# Patient Record
Sex: FEMALE | Race: WHITE | NOT HISPANIC OR LATINO | URBAN - METROPOLITAN AREA
[De-identification: names, ages, dates, MRNs, and addresses within clinical notes are randomized per-mention and may not be internally consistent; named-entity substitution may affect disease eponyms.]

---

## 2020-06-18 ENCOUNTER — OFFICE VISIT (OUTPATIENT)
Dept: URBAN - METROPOLITAN AREA CLINIC 113 | Facility: CLINIC | Age: 75
End: 2020-06-18

## 2020-07-25 ENCOUNTER — TELEPHONE ENCOUNTER (OUTPATIENT)
Dept: URBAN - METROPOLITAN AREA CLINIC 13 | Facility: CLINIC | Age: 75
End: 2020-07-25

## 2020-07-25 RX ORDER — POLYETHYLENE GLYCOL 3350, SODIUM CHLORIDE, SODIUM BICARBONATE AND POTASSIUM CHLORIDE WITH LEMON FLAVOR 420; 11.2; 5.72; 1.48 G/4L; G/4L; G/4L; G/4L
DRINK 32 OUNCES AT 5PM DAY BEFORE PROCEDURE AND 6 HOURS PRIOR POWDER, FOR SOLUTION ORAL
Qty: 1 | Refills: 0 | OUTPATIENT
Start: 2014-01-06 | End: 2014-02-04

## 2020-07-25 RX ORDER — DULOXETINE HCL 30 MG
TAKE 1 CAPSULE DAILY CAPSULE,DELAYED RELEASE (ENTERIC COATED) ORAL
Refills: 0 | OUTPATIENT
End: 2015-04-14

## 2020-07-25 RX ORDER — FUROSEMIDE 20 MG/1
TAKE 1 TABLET  PRN TABLET ORAL
Refills: 0 | OUTPATIENT
End: 2015-01-30

## 2020-07-25 RX ORDER — CITALOPRAM HYDROBROMIDE 10 MG/1
TAKE 1 TABLET DAILY TABLET, FILM COATED ORAL
Qty: 30 | Refills: 6 | OUTPATIENT
Start: 2015-05-05 | End: 2015-07-24

## 2020-07-25 RX ORDER — POTASSIUM CHLORIDE 750 MG/1
TAKE 4 CAPSULE DAILY CAPSULE, EXTENDED RELEASE ORAL
Qty: 16 | Refills: 0 | OUTPATIENT
Start: 2015-04-29 | End: 2016-12-23

## 2020-07-25 RX ORDER — OXYCODONE HYDROCHLORIDE 10 MG/1
TAKE 1 TABLET EVERY 6 HOURS PRN ABDOMINAL PAIN TABLET ORAL
Qty: 75 | Refills: 0 | OUTPATIENT
Start: 2015-10-23 | End: 2015-11-19

## 2020-07-25 RX ORDER — POLYETHYLENE GLYCOL 3350, SODIUM CHLORIDE, SODIUM BICARBONATE AND POTASSIUM CHLORIDE WITH LEMON FLAVOR 420; 11.2; 5.72; 1.48 G/4L; G/4L; G/4L; G/4L
TAKE AS DIRECTED POWDER, FOR SOLUTION ORAL
Qty: 1 | Refills: 0 | OUTPATIENT
Start: 2016-12-23 | End: 2016-12-23

## 2020-07-25 RX ORDER — PANTOPRAZOLE SODIUM 40 MG/1
TAKE 1 TABLET DAILY TABLET, DELAYED RELEASE ORAL
Qty: 90 | Refills: 3 | OUTPATIENT
Start: 2015-06-11 | End: 2016-04-21

## 2020-07-25 RX ORDER — DIPHENOXYLATE HYDROCHLORIDE AND ATROPINE SULFATE 2.5; .025 MG/1; MG/1
TAKE 1-2 EVERY 6 HOURS PRN DIARRHEA TABLET ORAL
Qty: 60 | Refills: 4 | OUTPATIENT
Start: 2017-03-27 | End: 2018-08-17

## 2020-07-25 RX ORDER — MEGESTROL ACETATE 40 MG/ML
TAKE TEN MILLILITERS BY MOUTH DAILY SUSPENSION ORAL
Qty: 2 | Refills: 11 | OUTPATIENT
Start: 2018-12-10 | End: 2019-02-08

## 2020-07-25 RX ORDER — CYANOCOBALAMIN 1000 UG/ML
GIVE 1 ML(1000MG) IM WEEKLY X 4 WEEKS AND THEN ONCE MONTHLY INJECTION INTRAMUSCULAR; SUBCUTANEOUS
Qty: 1 | Refills: 6 | OUTPATIENT
Start: 2018-09-17 | End: 2019-02-08

## 2020-07-25 RX ORDER — CLOPIDOGREL BISULFATE 75 MG
TAKE 1 TABLET DAILY TABLET ORAL
Refills: 0 | OUTPATIENT
End: 2018-12-03

## 2020-07-25 RX ORDER — SIMVASTATIN 80 MG/1
TAKE 1 TABLET DAILY TABLET, FILM COATED ORAL
Refills: 0 | OUTPATIENT
Start: 2011-05-10 | End: 2015-01-30

## 2020-07-25 RX ORDER — CITALOPRAM 20 MG/1
TAKE 1 TABLET DAILY TABLET ORAL
Qty: 90 | Refills: 3 | OUTPATIENT
Start: 2015-06-11 | End: 2015-10-23

## 2020-07-25 RX ORDER — ARIPIPRAZOLE 2 MG/1
TAKE 1 TABLET DAILY AS DIRECTED TABLET ORAL
Refills: 0 | OUTPATIENT
End: 2018-08-17

## 2020-07-25 RX ORDER — OMEPRAZOLE 20 MG/1
TAKE 1 CAPSULE DAILY EVERY MORNING BEFORE BREAKFAST CAPSULE, DELAYED RELEASE ORAL
Refills: 0 | OUTPATIENT
End: 2015-04-14

## 2020-07-25 RX ORDER — TRAMADOL HYDROCHLORIDE 50 MG/1
TAKE 1 TABLET BY MOUTH EVERY 6 HOURS AS NEEDED FOR PAIN TABLET ORAL
Qty: 50 | Refills: 1 | OUTPATIENT
Start: 2015-05-05 | End: 2015-06-29

## 2020-07-25 RX ORDER — FLUTICASONE PROPIONATE AND SALMETEROL XINAFOATE 230; 21 UG/1; UG/1
INHALE 2 PUFFS TWICE DAILY UNKNOWN STRENGTH AEROSOL, METERED RESPIRATORY (INHALATION)
Refills: 0 | OUTPATIENT
Start: 2010-05-20 | End: 2015-04-14

## 2020-07-25 RX ORDER — RIFAXIMIN 550 MG/1
TAKE 1 TABLET 3 TIMES DAILY TABLET ORAL
Qty: 30 | Refills: 0 | OUTPATIENT
Start: 2014-12-23 | End: 2015-01-30

## 2020-07-25 RX ORDER — OXYCODONE HYDROCHLORIDE 10 MG/1
TAKE 1 TABLET EVERY 6 HOURS PRN ABDOMINAL PAIN TABLET ORAL
Qty: 75 | Refills: 0 | OUTPATIENT
Start: 2015-05-12 | End: 2016-12-23

## 2020-07-25 RX ORDER — METRONIDAZOLE 500 MG/1
TAKE 1 TABLET TWICE DAILY UNTIL FINISHED TABLET ORAL
Qty: 20 | Refills: 0 | OUTPATIENT
Start: 2013-10-01 | End: 2014-12-23

## 2020-07-25 RX ORDER — POLYETHYLENE GLYCOL 3350, SODIUM CHLORIDE, SODIUM BICARBONATE AND POTASSIUM CHLORIDE WITH LEMON FLAVOR 420; 11.2; 5.72; 1.48 G/4L; G/4L; G/4L; G/4L
TAKE HALF 5PM THE EVENING BEFORE THE PROCEDURE, AND TAKE THE OTHER HALF 6 HOURS BEFORE THE PROCEDURE POWDER, FOR SOLUTION ORAL
Qty: 1 | Refills: 0 | OUTPATIENT
Start: 2015-01-30 | End: 2015-04-14

## 2020-07-25 RX ORDER — CHLORHEXIDINE GLUCONATE 4 %
TAKE 1 TABLET DAILY LIQUID (ML) TOPICAL
Qty: 31 | Refills: 11 | OUTPATIENT
Start: 2012-06-28 | End: 2014-02-04

## 2020-07-25 RX ORDER — OXYCODONE AND ACETAMINOPHEN 7.5; 325 MG/1; MG/1
TAKE 1 TABLET EVERY 6 HOURS AS NEEDED FOR PAIN TABLET ORAL
Qty: 60 | Refills: 0 | OUTPATIENT
Start: 2018-12-03 | End: 2019-02-08

## 2020-07-25 RX ORDER — CHOLECALCIFEROL (VITAMIN D3) 1250 MCG
TAKE 1 CAPSULE DAILY CAPSULE ORAL
Refills: 0 | OUTPATIENT
End: 2015-04-14

## 2020-07-25 RX ORDER — FLUTICASONE PROPIONATE AND SALMETEROL 50; 250 UG/1; UG/1
INHALE 1 PUFF EVERY 12 HOURS POWDER RESPIRATORY (INHALATION)
Refills: 0 | OUTPATIENT
End: 2019-02-08

## 2020-07-25 RX ORDER — FLUTICASONE PROPIONATE AND SALMETEROL XINAFOATE 230; 21 UG/1; UG/1
INHALE 2 PUFFS AT 12 HOUR INTERVALS (MORNING AND EVENING) AEROSOL, METERED RESPIRATORY (INHALATION)
Refills: 0 | OUTPATIENT
Start: 2010-04-15 | End: 2010-05-20

## 2020-07-25 RX ORDER — ATORVASTATIN CALCIUM 40 MG/1
TAKE 1 TABLET DAILY TABLET, FILM COATED ORAL
Refills: 0 | OUTPATIENT
End: 2015-05-05

## 2020-07-25 RX ORDER — POTASSIUM CHLORIDE 1500 MG/1
TAKE 2 TABLET DAILY TABLET, FILM COATED, EXTENDED RELEASE ORAL
Qty: 8 | Refills: 0 | OUTPATIENT
Start: 2015-09-11 | End: 2016-12-23

## 2020-07-25 RX ORDER — BISMUTH SUBSALICYLATE 525 MG/1
TAKE 1 CAPSULE DAILY PT STATES UNKNOWN DOSE TABLET ORAL
Refills: 0 | OUTPATIENT
End: 2017-02-16

## 2020-07-25 RX ORDER — OMEPRAZOLE 20 MG/1
TAKE 1 TABLET DAILY CAPSULE, DELAYED RELEASE ORAL
Qty: 90 | Refills: 4 | OUTPATIENT
Start: 2012-05-15 | End: 2015-04-14

## 2020-07-25 RX ORDER — LUBIPROSTONE 8 UG/1
TAKE 1 CAPSULE DAILY UNKNOWN STRENGTH CAPSULE, GELATIN COATED ORAL
Refills: 0 | OUTPATIENT
Start: 2010-05-20 | End: 2010-06-14

## 2020-07-25 RX ORDER — EZETIMIBE AND SIMVASTATIN 10; 80 MG/1; MG/1
TAKE 1 TABLET DAILY TABLET ORAL
Refills: 0 | OUTPATIENT
Start: 2006-04-24 | End: 2010-05-20

## 2020-07-25 RX ORDER — BISMUTH SUBSALICYLATE 525 MG/1
TAKE 1 CAPSULE DAILY TABLET ORAL
Qty: 30 | Refills: 0 | OUTPATIENT
Start: 2013-10-01 | End: 2014-02-04

## 2020-07-25 RX ORDER — SUCRALFATE 1 G/10ML
TAKE 10 ML AFTER MEALS PRN HEARTBURN, UPSET STOMACH SUSPENSION ORAL
Qty: 450 | Refills: 11 | OUTPATIENT
Start: 2018-12-14 | End: 2019-02-08

## 2020-07-25 RX ORDER — ATORVASTATIN CALCIUM 10 MG/1
TAKE 1 TABLET DAILY TABLET, FILM COATED ORAL
Refills: 0 | OUTPATIENT
End: 2015-04-14

## 2020-07-26 ENCOUNTER — TELEPHONE ENCOUNTER (OUTPATIENT)
Dept: URBAN - METROPOLITAN AREA CLINIC 13 | Facility: CLINIC | Age: 75
End: 2020-07-26

## 2020-07-26 RX ORDER — HYDROCODONE BITARTRATE AND ACETAMINOPHEN 7.5; 325 MG/1; MG/1
TAKE 1 TABLET EVERY 8 HOURS PRN ABDOMINAL PAIN TABLET ORAL
Qty: 45 | Refills: 0 | Status: ACTIVE | COMMUNITY
Start: 2019-08-27

## 2020-07-26 RX ORDER — ONDANSETRON 8 MG/1
TAKE 1 TABLET EVERY 8 HOURS PRN NAUSEA TABLET ORAL
Qty: 45 | Refills: 11 | Status: ACTIVE | COMMUNITY
Start: 2018-12-03

## 2020-07-26 RX ORDER — FOLIC ACID 1 MG/1
TAKE 1 TABLET DAILY TABLET ORAL
Refills: 0 | Status: ACTIVE | COMMUNITY

## 2020-07-26 RX ORDER — CITALOPRAM 40 MG/1
TAKE 1 TABLET DAILY TABLET, FILM COATED ORAL
Qty: 90 | Refills: 3 | Status: ACTIVE | COMMUNITY
Start: 2020-04-28

## 2020-07-26 RX ORDER — MIDODRINE HYDROCHLORIDE 10 MG/1
TAKE 1 TABLET 3 TIMES DAILY TABLET ORAL
Refills: 0 | Status: ACTIVE | COMMUNITY

## 2020-07-26 RX ORDER — MIDODRINE HYDROCHLORIDE 10 MG/1
TAKE 1 TABLET TWICE DAILY TABLET ORAL
Qty: 2 | Refills: 0 | Status: ACTIVE | COMMUNITY
Start: 2018-02-26

## 2020-07-26 RX ORDER — ATORVASTATIN CALCIUM 40 MG/1
TAKE 1 TABLET DAILY AS DIRECTED TABLET, FILM COATED ORAL
Qty: 1 | Refills: 3 | Status: ACTIVE | COMMUNITY
Start: 2020-04-28

## 2020-07-26 RX ORDER — LEVOTHYROXINE SODIUM 0.05 MG/1
TAKE 1 TABLET DAILY TABLET ORAL
Refills: 0 | Status: ACTIVE | COMMUNITY

## 2020-07-26 RX ORDER — ASPIRIN 81 MG/1
TAKE 1 TABLET DAILY AS DIRECTED TABLET, DELAYED RELEASE ORAL
Refills: 0 | Status: ACTIVE | COMMUNITY

## 2020-07-26 RX ORDER — CYANOCOBALAMIN 1000 UG/ML
INJECT 1ML INTRAMUSCULARLY MONTHLY INJECTION INTRAMUSCULAR; SUBCUTANEOUS
Qty: 1 | Refills: 3 | Status: ACTIVE | COMMUNITY
Start: 2020-04-28

## 2020-07-26 RX ORDER — TIOTROPIUM BROMIDE 18 UG/1
INHALE CONTENTS OF 1 CAPSULE ONCE DAILY CAPSULE ORAL; RESPIRATORY (INHALATION)
Refills: 0 | Status: ACTIVE | COMMUNITY

## 2020-07-26 RX ORDER — FUROSEMIDE 20 MG/1
TAKE 1 TABLET DAILY TABLET ORAL
Qty: 30 | Refills: 11 | Status: ACTIVE | COMMUNITY
Start: 2020-04-28

## 2020-07-26 RX ORDER — PANTOPRAZOLE SODIUM 40 MG/1
TAKE 1 TABLET DAILY TABLET, DELAYED RELEASE ORAL
Qty: 90 | Refills: 3 | Status: ACTIVE | COMMUNITY
Start: 2015-04-14

## 2020-08-07 ENCOUNTER — OFFICE VISIT (OUTPATIENT)
Dept: URBAN - METROPOLITAN AREA CLINIC 113 | Facility: CLINIC | Age: 75
End: 2020-08-07

## 2020-08-07 NOTE — HPI-TODAY'S VISIT:
This is a 75-year-old female with a history of chronic pancreatitis with pancreatic cyst, GERD, gastric bypass in 2015, IBS, liver enzyme elevation, iron deficiency anemia, G-tube placement and bypassed stomach section for nutrition, and adenomatous colon polyps due surveillance in 2022, she was seen in hospital consultation 6/16/2020 for diarrhea.  Her feeding tube had fallen out 3 to 4 months prior and, because she was eating fairly well at the time, they had not return to the emergency department to have it replaced.  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  She had experienced a significant exacerbation of diarrhea for 3 weeks and CT scan revealed some mucosal thickening throughout the colon.  Stool studies were ordered.  She was to continue with Cipro and Flagyl.  Zenpep was increased to 3 tablets daily with meals.  She was diagnosed with bilateral pneumonia, severe protein calorie malnutrition, and cirrhosis with mild to moderate ascites.